# Patient Record
Sex: MALE | Race: BLACK OR AFRICAN AMERICAN | NOT HISPANIC OR LATINO | Employment: FULL TIME | URBAN - METROPOLITAN AREA
[De-identification: names, ages, dates, MRNs, and addresses within clinical notes are randomized per-mention and may not be internally consistent; named-entity substitution may affect disease eponyms.]

---

## 2024-09-11 ENCOUNTER — HOSPITAL ENCOUNTER (INPATIENT)
Facility: HOSPITAL | Age: 44
LOS: 2 days | Discharge: HOME | DRG: 322 | End: 2024-09-13
Attending: STUDENT IN AN ORGANIZED HEALTH CARE EDUCATION/TRAINING PROGRAM | Admitting: INTERNAL MEDICINE
Payer: COMMERCIAL

## 2024-09-11 DIAGNOSIS — R07.9 CHEST PAIN, UNSPECIFIED TYPE: ICD-10-CM

## 2024-09-11 DIAGNOSIS — R07.9 CHEST PAIN: ICD-10-CM

## 2024-09-11 DIAGNOSIS — I21.3 STEMI (ST ELEVATION MYOCARDIAL INFARCTION): Primary | ICD-10-CM

## 2024-09-11 DIAGNOSIS — I25.10 CAD (CORONARY ARTERY DISEASE): ICD-10-CM

## 2024-09-11 DIAGNOSIS — I21.3 ST ELEVATION MYOCARDIAL INFARCTION (STEMI), UNSPECIFIED ARTERY: ICD-10-CM

## 2024-09-11 PROBLEM — I21.21 ST ELEVATION MYOCARDIAL INFARCTION INVOLVING LEFT CIRCUMFLEX CORONARY ARTERY: Status: ACTIVE | Noted: 2024-09-11

## 2024-09-11 LAB
ABS NEUT (OLG): 17.37 X10(3)/MCL (ref 2.1–9.2)
ALBUMIN SERPL-MCNC: 4.4 G/DL (ref 3.5–5)
ALBUMIN/GLOB SERPL: 1.2 RATIO (ref 1.1–2)
ALP SERPL-CCNC: 100 UNIT/L (ref 40–150)
ALT SERPL-CCNC: 33 UNIT/L (ref 0–55)
ANION GAP SERPL CALC-SCNC: 21 MEQ/L
APICAL FOUR CHAMBER EJECTION FRACTION: 60 %
APICAL TWO CHAMBER EJECTION FRACTION: 57 %
AST SERPL-CCNC: 36 UNIT/L (ref 5–34)
AV INDEX (PROSTH): 0.98
AV MEAN GRADIENT: 4 MMHG
AV PEAK GRADIENT: 7 MMHG
AV VALVE AREA BY VELOCITY RATIO: 2.86 CM²
AV VALVE AREA: 2.79 CM²
AV VELOCITY RATIO: 1.01
BILIRUB SERPL-MCNC: 0.3 MG/DL
BSA FOR ECHO PROCEDURE: 1.78 M2
BUN SERPL-MCNC: 13.5 MG/DL (ref 8.9–20.6)
CALCIUM SERPL-MCNC: 9.1 MG/DL (ref 8.4–10.2)
CHLORIDE SERPL-SCNC: 110 MMOL/L (ref 98–107)
CO2 SERPL-SCNC: 13 MMOL/L (ref 22–29)
CREAT SERPL-MCNC: 1.07 MG/DL (ref 0.73–1.18)
CREAT/UREA NIT SERPL: 13
CV ECHO LV RWT: 0.47 CM
DOP CALC AO PEAK VEL: 1.28 M/S
DOP CALC AO VTI: 19.8 CM
DOP CALC LVOT AREA: 2.8 CM2
DOP CALC LVOT DIAMETER: 1.9 CM
DOP CALC LVOT PEAK VEL: 1.29 M/S
DOP CALC LVOT STROKE VOLUME: 55.26 CM3
DOP CALC MV VTI: 19.3 CM
DOP CALCLVOT PEAK VEL VTI: 19.5 CM
E WAVE DECELERATION TIME: 161 MSEC
E/A RATIO: 0.95
E/E' RATIO: 7.64 M/S
ECHO LV POSTERIOR WALL: 0.9 CM (ref 0.6–1.1)
ERYTHROCYTE [DISTWIDTH] IN BLOOD BY AUTOMATED COUNT: 13 % (ref 11.5–17)
FRACTIONAL SHORTENING: 32 % (ref 28–44)
GFR SERPLBLD CREATININE-BSD FMLA CKD-EPI: >60 ML/MIN/1.73/M2
GLOBULIN SER-MCNC: 3.6 GM/DL (ref 2.4–3.5)
GLUCOSE SERPL-MCNC: 61 MG/DL (ref 74–100)
HCT VFR BLD AUTO: 49.5 % (ref 42–52)
HGB BLD-MCNC: 16.4 G/DL (ref 14–18)
INSTRUMENT WBC (OLG): 24.47 X10(3)/MCL
INTERVENTRICULAR SEPTUM: 0.8 CM (ref 0.6–1.1)
LEFT ATRIUM AREA SYSTOLIC (APICAL 2 CHAMBER): 11.9 CM2
LEFT ATRIUM AREA SYSTOLIC (APICAL 4 CHAMBER): 12.1 CM2
LEFT ATRIUM SIZE: 2.9 CM
LEFT ATRIUM VOLUME INDEX MOD: 15.5 ML/M2
LEFT ATRIUM VOLUME MOD: 27.4 CM3
LEFT INTERNAL DIMENSION IN SYSTOLE: 2.6 CM (ref 2.1–4)
LEFT VENTRICLE DIASTOLIC VOLUME INDEX: 35.03 ML/M2
LEFT VENTRICLE DIASTOLIC VOLUME: 62 ML
LEFT VENTRICLE END DIASTOLIC VOLUME APICAL 2 CHAMBER: 60.4 ML
LEFT VENTRICLE END DIASTOLIC VOLUME APICAL 4 CHAMBER: 66.3 ML
LEFT VENTRICLE END SYSTOLIC VOLUME APICAL 2 CHAMBER: 28.1 ML
LEFT VENTRICLE END SYSTOLIC VOLUME APICAL 4 CHAMBER: 26.2 ML
LEFT VENTRICLE MASS INDEX: 53 G/M2
LEFT VENTRICLE SYSTOLIC VOLUME INDEX: 13.9 ML/M2
LEFT VENTRICLE SYSTOLIC VOLUME: 24.6 ML
LEFT VENTRICULAR INTERNAL DIMENSION IN DIASTOLE: 3.8 CM (ref 3.5–6)
LEFT VENTRICULAR MASS: 93.37 G
LV LATERAL E/E' RATIO: 7 M/S
LV SEPTAL E/E' RATIO: 8.4 M/S
LVED V (TEICH): 62 ML
LVES V (TEICH): 24.6 ML
LVOT MG: 3 MMHG
LVOT MV: 0.82 CM/S
LYMPHOCYTES NFR BLD MANUAL: 21 %
LYMPHOCYTES NFR BLD MANUAL: 5.14 X10(3)/MCL
MCH RBC QN AUTO: 32.2 PG (ref 27–31)
MCHC RBC AUTO-ENTMCNC: 33.1 G/DL (ref 33–36)
MCV RBC AUTO: 97.1 FL (ref 80–94)
MONOCYTES NFR BLD MANUAL: 1.96 X10(3)/MCL (ref 0.1–1.3)
MONOCYTES NFR BLD MANUAL: 8 %
MV MEAN GRADIENT: 3 MMHG
MV PEAK A VEL: 0.88 M/S
MV PEAK E VEL: 0.84 M/S
MV PEAK GRADIENT: 4 MMHG
MV VALVE AREA BY CONTINUITY EQUATION: 2.86 CM2
NEUTROPHILS NFR BLD MANUAL: 71 %
NRBC BLD AUTO-RTO: 0 %
OHS CV RV/LV RATIO: 0.58 CM
OHS LV EJECTION FRACTION SIMPSONS BIPLANE MOD: 58 %
PLATELET # BLD AUTO: 327 X10(3)/MCL (ref 130–400)
PLATELET # BLD EST: NORMAL 10*3/UL
PMV BLD AUTO: 9.3 FL (ref 7.4–10.4)
POC CARDIAC TROPONIN I: 0.02 NG/ML (ref 0–0.08)
POTASSIUM SERPL-SCNC: 4.2 MMOL/L (ref 3.5–5.1)
PROT SERPL-MCNC: 8 GM/DL (ref 6.4–8.3)
RA WIDTH: 3.8 CM
RBC # BLD AUTO: 5.1 X10(6)/MCL (ref 4.7–6.1)
RBC MORPH BLD: NORMAL
RIGHT VENTRICULAR END-DIASTOLIC DIMENSION: 2.2 CM
SAMPLE: NORMAL
SODIUM SERPL-SCNC: 144 MMOL/L (ref 136–145)
TDI LATERAL: 0.12 M/S
TDI SEPTAL: 0.1 M/S
TDI: 0.11 M/S
TRICUSPID ANNULAR PLANE SYSTOLIC EXCURSION: 1.91 CM
TROPONIN I SERPL-MCNC: 1.33 NG/ML (ref 0–0.04)
TROPONIN I SERPL-MCNC: <0.01 NG/ML (ref 0–0.04)
WBC # BLD AUTO: 24.47 X10(3)/MCL (ref 4.5–11.5)
Z-SCORE OF LEFT VENTRICULAR DIMENSION IN END DIASTOLE: -2.53
Z-SCORE OF LEFT VENTRICULAR DIMENSION IN END SYSTOLE: -1.2

## 2024-09-11 PROCEDURE — 93458 L HRT ARTERY/VENTRICLE ANGIO: CPT | Mod: XU | Performed by: INTERNAL MEDICINE

## 2024-09-11 PROCEDURE — 63600175 PHARM REV CODE 636 W HCPCS: Performed by: STUDENT IN AN ORGANIZED HEALTH CARE EDUCATION/TRAINING PROGRAM

## 2024-09-11 PROCEDURE — C1769 GUIDE WIRE: HCPCS | Performed by: INTERNAL MEDICINE

## 2024-09-11 PROCEDURE — 4A023N7 MEASUREMENT OF CARDIAC SAMPLING AND PRESSURE, LEFT HEART, PERCUTANEOUS APPROACH: ICD-10-PCS | Performed by: INTERNAL MEDICINE

## 2024-09-11 PROCEDURE — 80053 COMPREHEN METABOLIC PANEL: CPT | Performed by: STUDENT IN AN ORGANIZED HEALTH CARE EDUCATION/TRAINING PROGRAM

## 2024-09-11 PROCEDURE — 84484 ASSAY OF TROPONIN QUANT: CPT | Performed by: INTERNAL MEDICINE

## 2024-09-11 PROCEDURE — 27000221 HC OXYGEN, UP TO 24 HOURS

## 2024-09-11 PROCEDURE — 99291 CRITICAL CARE FIRST HOUR: CPT

## 2024-09-11 PROCEDURE — 25000003 PHARM REV CODE 250: Performed by: INTERNAL MEDICINE

## 2024-09-11 PROCEDURE — 11000001 HC ACUTE MED/SURG PRIVATE ROOM

## 2024-09-11 PROCEDURE — B2111ZZ FLUOROSCOPY OF MULTIPLE CORONARY ARTERIES USING LOW OSMOLAR CONTRAST: ICD-10-PCS | Performed by: INTERNAL MEDICINE

## 2024-09-11 PROCEDURE — C1753 CATH, INTRAVAS ULTRASOUND: HCPCS | Performed by: INTERNAL MEDICINE

## 2024-09-11 PROCEDURE — C9606 PERC D-E COR REVASC W AMI S: HCPCS | Mod: LC | Performed by: INTERNAL MEDICINE

## 2024-09-11 PROCEDURE — C1874 STENT, COATED/COV W/DEL SYS: HCPCS | Performed by: INTERNAL MEDICINE

## 2024-09-11 PROCEDURE — 85007 BL SMEAR W/DIFF WBC COUNT: CPT | Performed by: INTERNAL MEDICINE

## 2024-09-11 PROCEDURE — 63600175 PHARM REV CODE 636 W HCPCS: Performed by: INTERNAL MEDICINE

## 2024-09-11 PROCEDURE — 93010 ELECTROCARDIOGRAM REPORT: CPT | Mod: ,,, | Performed by: INTERNAL MEDICINE

## 2024-09-11 PROCEDURE — 25500020 PHARM REV CODE 255: Performed by: INTERNAL MEDICINE

## 2024-09-11 PROCEDURE — 25000003 PHARM REV CODE 250: Performed by: NURSE PRACTITIONER

## 2024-09-11 PROCEDURE — 027034Z DILATION OF CORONARY ARTERY, ONE ARTERY WITH DRUG-ELUTING INTRALUMINAL DEVICE, PERCUTANEOUS APPROACH: ICD-10-PCS | Performed by: INTERNAL MEDICINE

## 2024-09-11 PROCEDURE — 27201423 OPTIME MED/SURG SUP & DEVICES STERILE SUPPLY: Performed by: INTERNAL MEDICINE

## 2024-09-11 PROCEDURE — C1760 CLOSURE DEV, VASC: HCPCS | Performed by: INTERNAL MEDICINE

## 2024-09-11 PROCEDURE — B240ZZ3 ULTRASONOGRAPHY OF SINGLE CORONARY ARTERY, INTRAVASCULAR: ICD-10-PCS | Performed by: INTERNAL MEDICINE

## 2024-09-11 PROCEDURE — B2151ZZ FLUOROSCOPY OF LEFT HEART USING LOW OSMOLAR CONTRAST: ICD-10-PCS | Performed by: INTERNAL MEDICINE

## 2024-09-11 PROCEDURE — C1894 INTRO/SHEATH, NON-LASER: HCPCS | Performed by: INTERNAL MEDICINE

## 2024-09-11 PROCEDURE — 93005 ELECTROCARDIOGRAM TRACING: CPT

## 2024-09-11 PROCEDURE — 21400001 HC TELEMETRY ROOM

## 2024-09-11 PROCEDURE — 85027 COMPLETE CBC AUTOMATED: CPT | Performed by: STUDENT IN AN ORGANIZED HEALTH CARE EDUCATION/TRAINING PROGRAM

## 2024-09-11 PROCEDURE — C1887 CATHETER, GUIDING: HCPCS | Performed by: INTERNAL MEDICINE

## 2024-09-11 PROCEDURE — 96374 THER/PROPH/DIAG INJ IV PUSH: CPT

## 2024-09-11 PROCEDURE — C1725 CATH, TRANSLUMIN NON-LASER: HCPCS | Performed by: INTERNAL MEDICINE

## 2024-09-11 PROCEDURE — 92978 ENDOLUMINL IVUS OCT C 1ST: CPT | Mod: LC | Performed by: INTERNAL MEDICINE

## 2024-09-11 PROCEDURE — 36415 COLL VENOUS BLD VENIPUNCTURE: CPT | Performed by: INTERNAL MEDICINE

## 2024-09-11 PROCEDURE — 84484 ASSAY OF TROPONIN QUANT: CPT | Performed by: STUDENT IN AN ORGANIZED HEALTH CARE EDUCATION/TRAINING PROGRAM

## 2024-09-11 PROCEDURE — 25000003 PHARM REV CODE 250: Performed by: STUDENT IN AN ORGANIZED HEALTH CARE EDUCATION/TRAINING PROGRAM

## 2024-09-11 DEVICE — EVEROLIMUS-ELUTING PLATINUM CHROMIUM CORONARY STENT SYSTEM
Type: IMPLANTABLE DEVICE | Site: CORONARY | Status: FUNCTIONAL
Brand: SYNERGY™ XD

## 2024-09-11 DEVICE — ANGIO-SEAL VIP VASCULAR CLOSURE DEVICE
Type: IMPLANTABLE DEVICE | Site: CORONARY | Status: FUNCTIONAL
Brand: ANGIO-SEAL

## 2024-09-11 RX ORDER — HYDROCODONE BITARTRATE AND ACETAMINOPHEN 5; 325 MG/1; MG/1
1 TABLET ORAL EVERY 4 HOURS PRN
Status: DISCONTINUED | OUTPATIENT
Start: 2024-09-11 | End: 2024-09-13 | Stop reason: HOSPADM

## 2024-09-11 RX ORDER — ATORVASTATIN CALCIUM 40 MG/1
80 TABLET, FILM COATED ORAL DAILY
Status: DISCONTINUED | OUTPATIENT
Start: 2024-09-11 | End: 2024-09-13 | Stop reason: HOSPADM

## 2024-09-11 RX ORDER — SODIUM CHLORIDE 9 MG/ML
INJECTION, SOLUTION INTRAVENOUS CONTINUOUS
Status: ACTIVE | OUTPATIENT
Start: 2024-09-11 | End: 2024-09-11

## 2024-09-11 RX ORDER — CEFAZOLIN SODIUM 1 G/3ML
INJECTION, POWDER, FOR SOLUTION INTRAMUSCULAR; INTRAVENOUS
Status: DISCONTINUED | OUTPATIENT
Start: 2024-09-11 | End: 2024-09-11 | Stop reason: HOSPADM

## 2024-09-11 RX ORDER — NITROGLYCERIN 20 MG/100ML
INJECTION INTRAVENOUS
Status: DISCONTINUED | OUTPATIENT
Start: 2024-09-11 | End: 2024-09-11 | Stop reason: HOSPADM

## 2024-09-11 RX ORDER — HYDRALAZINE HYDROCHLORIDE 20 MG/ML
10 INJECTION INTRAMUSCULAR; INTRAVENOUS EVERY 4 HOURS PRN
Status: DISCONTINUED | OUTPATIENT
Start: 2024-09-11 | End: 2024-09-13 | Stop reason: HOSPADM

## 2024-09-11 RX ORDER — ASPIRIN 81 MG/1
81 TABLET ORAL DAILY
Status: DISCONTINUED | OUTPATIENT
Start: 2024-09-12 | End: 2024-09-13 | Stop reason: HOSPADM

## 2024-09-11 RX ORDER — ALUMINUM HYDROXIDE, MAGNESIUM HYDROXIDE, AND SIMETHICONE 1200; 120; 1200 MG/30ML; MG/30ML; MG/30ML
30 SUSPENSION ORAL EVERY 4 HOURS PRN
Status: DISCONTINUED | OUTPATIENT
Start: 2024-09-11 | End: 2024-09-13 | Stop reason: HOSPADM

## 2024-09-11 RX ORDER — ACETAMINOPHEN 325 MG/1
650 TABLET ORAL EVERY 4 HOURS PRN
Status: DISCONTINUED | OUTPATIENT
Start: 2024-09-11 | End: 2024-09-13 | Stop reason: HOSPADM

## 2024-09-11 RX ORDER — ONDANSETRON 4 MG/1
8 TABLET, ORALLY DISINTEGRATING ORAL EVERY 8 HOURS PRN
Status: DISCONTINUED | OUTPATIENT
Start: 2024-09-11 | End: 2024-09-13 | Stop reason: HOSPADM

## 2024-09-11 RX ORDER — LIDOCAINE HYDROCHLORIDE 10 MG/ML
INJECTION, SOLUTION EPIDURAL; INFILTRATION; INTRACAUDAL; PERINEURAL
Status: DISCONTINUED | OUTPATIENT
Start: 2024-09-11 | End: 2024-09-11 | Stop reason: HOSPADM

## 2024-09-11 RX ORDER — HEPARIN SODIUM 5000 [USP'U]/ML
5000 INJECTION, SOLUTION INTRAVENOUS; SUBCUTANEOUS
Status: COMPLETED | OUTPATIENT
Start: 2024-09-11 | End: 2024-09-11

## 2024-09-11 RX ORDER — HEPARIN SODIUM 1000 [USP'U]/ML
INJECTION, SOLUTION INTRAVENOUS; SUBCUTANEOUS
Status: DISCONTINUED | OUTPATIENT
Start: 2024-09-11 | End: 2024-09-11 | Stop reason: HOSPADM

## 2024-09-11 RX ORDER — DIPHENHYDRAMINE HYDROCHLORIDE 50 MG/ML
INJECTION INTRAMUSCULAR; INTRAVENOUS
Status: DISCONTINUED | OUTPATIENT
Start: 2024-09-11 | End: 2024-09-11 | Stop reason: HOSPADM

## 2024-09-11 RX ORDER — FENTANYL CITRATE 50 UG/ML
INJECTION, SOLUTION INTRAMUSCULAR; INTRAVENOUS
Status: DISCONTINUED | OUTPATIENT
Start: 2024-09-11 | End: 2024-09-11 | Stop reason: HOSPADM

## 2024-09-11 RX ORDER — IOPAMIDOL 755 MG/ML
INJECTION, SOLUTION INTRAVASCULAR
Status: DISCONTINUED | OUTPATIENT
Start: 2024-09-11 | End: 2024-09-11 | Stop reason: HOSPADM

## 2024-09-11 RX ORDER — MORPHINE SULFATE 4 MG/ML
2 INJECTION, SOLUTION INTRAMUSCULAR; INTRAVENOUS EVERY 4 HOURS PRN
Status: DISCONTINUED | OUTPATIENT
Start: 2024-09-11 | End: 2024-09-13 | Stop reason: HOSPADM

## 2024-09-11 RX ADMIN — TICAGRELOR 90 MG: 90 TABLET ORAL at 08:09

## 2024-09-11 RX ADMIN — ALUMINUM HYDROXIDE, MAGNESIUM HYDROXIDE, AND SIMETHICONE 30 ML: 1200; 120; 1200 SUSPENSION ORAL at 06:09

## 2024-09-11 RX ADMIN — TICAGRELOR 180 MG: 90 TABLET ORAL at 08:09

## 2024-09-11 RX ADMIN — HEPARIN SODIUM 5000 UNITS: 5000 INJECTION, SOLUTION INTRAVENOUS; SUBCUTANEOUS at 08:09

## 2024-09-11 NOTE — NURSING
Nurses Note -- 4 Eyes      9/11/2024   4:00 PM      Skin assessed during: Admit      [x] No Altered Skin Integrity Present    []Prevention Measures Documented      [] Yes- Altered Skin Integrity Present or Discovered   [] LDA Added if Not in Epic (Describe Wound)   [] New Altered Skin Integrity was Present on Admit and Documented in LDA   [] Wound Image Taken    Wound Care Consulted? No    Attending Nurse:  Catia Torres RN/Staff Member:  Leatha Louie RN

## 2024-09-11 NOTE — H&P
"[facility]    Cardiology  History and Physical     Patient Name: Roberto Camargo  MRN: [id number]  Admission Date: 9/11/2024  Code Status: No Order   Attending Provider: Jim Carreno IV, MD   Primary Care Physician: No primary care provider on file.  Principal Problem:<principal problem not specified>    Patient information was obtained from patient, past medical records, and ER records.     Subjective:     Chief Complaint: CP     HPI: Mr. Camargo is a 45 y/o male who is unknown to [facility]. The patient presented to [facility] on 9.11.24 with c/o CP. The patient reported that this AM he was having a "stressful and emotional argument" with his wife and he developed substernal CP that was rated 10/10 on a verbal scale. The pain was described as a "pressure" that was associated with SOB. He reported that the pain was so severe he fell to the floor at its onset. EMS was notified and upon arrival he was given FD ASA/SL Nitro and an EKG confirmed a STEMI. He was transported to [facility] ER for evaluation and workup. [facility] was consulted for Recommendations.     PMH: HLD  PSH: Denies Past Surgical History   Family History: + Family History of Heart Disease; Maternal and Paternal Grandparents  Social History: Denies Illicit Drug and ETOH Use; + Tobacco Use 1/2-1 PPD for 20 + Years     Previous Cardiac Diagnostics: None    Review of patient's allergies indicates:  No Known Allergies    No current facility-administered medications on file prior to encounter.     No current outpatient medications on file prior to encounter.     Review of Systems   Constitutional: Positive for malaise/fatigue.   Cardiovascular:  Positive for chest pain. Negative for leg swelling, palpitations and paroxysmal nocturnal dyspnea.   Respiratory:  Positive for shortness of breath.    All other systems reviewed and are negative.    Objective:     Vital Signs (Most Recent):  Temp: 97.5 °F (36.4 °C) (09/11/24 0839)  Pulse: 107 (09/11/24 0839)  Resp: " "(!) 22 (09/11/24 0839)  BP: 118/78 (09/11/24 0839)  SpO2: (!) 94 % (09/11/24 0839) Vital Signs (24h Range):  Temp:  [97.5 °F (36.4 °C)] 97.5 °F (36.4 °C)  Pulse:  [107] 107  Resp:  [22] 22  SpO2:  [94 %] 94 %  BP: (118)/(78) 118/78     Weight: 68 kg (150 lb)  Body mass index is 24.21 kg/m².    SpO2: (!) 94 %       No intake or output data in the 24 hours ending 09/11/24 0842    Lines/Drains/Airways       None                 Physical Exam  Constitutional:       General: He is not in acute distress.     Appearance: Normal appearance.   HENT:      Head: Normocephalic.      Mouth/Throat:      Mouth: Mucous membranes are moist.   Eyes:      Extraocular Movements: Extraocular movements intact.      Conjunctiva/sclera: Conjunctivae normal.   Cardiovascular:      Rate and Rhythm: Normal rate and regular rhythm.      Heart sounds: Normal heart sounds.   Pulmonary:      Effort: Pulmonary effort is normal. No respiratory distress.      Breath sounds: Normal breath sounds.   Abdominal:      Palpations: Abdomen is soft.   Musculoskeletal:         General: Normal range of motion.   Skin:     General: Skin is warm and dry.   Neurological:      General: No focal deficit present.      Mental Status: He is alert and oriented to person, place, and time.   Psychiatric:         Mood and Affect: Mood normal.       EKG: Reviewed     Telemetry: SR    Home Medications:   No current facility-administered medications on file prior to encounter.     No current outpatient medications on file prior to encounter.     Current Schedule Inpatient Medications:   heparin (porcine)  5,000 Units Intravenous ED 1 Time    ticagrelor  180 mg Oral BID     Continuous Infusions:     Significant Labs:   Chemistries:   No results for input(s): "NA", "K", "CL", "CO2", "BUN", "CREATININE", "CALCIUM", "PROT", "BILITOT", "ALKPHOS", "ALT", "AST", "GLUCOSE", "MG", "PHOS", "TROPONINI" in the last 168 hours.    Invalid input(s): "LABALBU"     CBC/Anemia Labs: Coags:  " "  No results for input(s): "WBC", "HGB", "HCT", "PLT", "MCV", "RDW", "IRON", "FERRITIN", "RETIC", "FOLATE", "[id number]", "OCCULTBLOOD" in the last 168 hours.    Invalid input(s): "IRONSATURATED" No results for input(s): "PT", "INR", "APTT" in the last 168 hours.     Assessment and Plan:   STEMI - Anterior Leads with Reciprocal Changes  HLD  Tobacco Use   No Hx of GIB     PLAN:  Keep NPO  Schedule for Emergent LHC with Possible PTCA +/- Stenting Today   Risk, Benefits and Alternatives Reviewed and Discussed with the PT and he wishes to proceed with above Procedure.   ASA 324mg PO x 1  Heparin 5000 Units IV x 1 Now  Brilinta 180mg PO x 1 Now  ECHO Today  Labs and EKG in AM: CBC, CMP, Mg, A1C, TSH and Lipid Panel    Daniel Morse, KI  Cardiology  [facility]  09/11/2024   "

## 2024-09-11 NOTE — ED PROVIDER NOTES
Encounter Date: 9/11/2024    SCRIBE #1 NOTE: I, Syd Crowley, am scribing for, and in the presence of,  Jim Carreno IV, MD. I have scribed the following portions of the note - Other sections scribed: HPI, ROS, and PE..       History     Chief Complaint   Patient presents with    Chest Pain     Presents via [facility] with c/o chest pain. Given 324mg ASA and 2 spray NTG. No medical history.     Roberto Camargo is a 44 y.o. male patient with no known medical history who presents to the Emergency Department for evaluation of chest pain which onset gradually PTA. Pt began having chest pressure at home with SOB. EMS responded, initial EKG was consistent with anterior STEMI. Patient received 2 sprays nitro and 324 mg Aspirin en route which has improved his sxs significantly.       The history is provided by the patient and the EMS personnel. No  was used.     Review of patient's allergies indicates:  No Known Allergies  Past Medical History:   Diagnosis Date    Mixed hyperlipidemia      No past surgical history on file.  No family history on file.     Review of Systems   Constitutional:  Negative for chills and fever.   HENT:  Negative for congestion, rhinorrhea and sore throat.    Eyes:  Negative for visual disturbance.   Respiratory:  Positive for shortness of breath. Negative for cough.    Cardiovascular:  Positive for chest pain.   Gastrointestinal:  Negative for abdominal pain, nausea and vomiting.   Genitourinary:  Negative for dysuria and hematuria.   Musculoskeletal:  Negative for joint swelling.   Skin:  Negative for rash.   Neurological:  Negative for weakness.   Psychiatric/Behavioral:  Negative for confusion.    All other systems reviewed and are negative.      Physical Exam     Initial Vitals [09/11/24 0839]   BP Pulse Resp Temp SpO2   118/78 107 (!) 22 97.5 °F (36.4 °C) (!) 94 %      MAP       --         Physical Exam    Nursing note and vitals reviewed.  Constitutional: He is not diaphoretic.  No distress.   Uncomfortable     HENT:   Head: Normocephalic and atraumatic.   Neck: Neck supple.   Normal range of motion.  Cardiovascular:  Normal rate and regular rhythm.           Pulmonary/Chest: Breath sounds normal. No respiratory distress.   Abdominal: Abdomen is soft. He exhibits no distension. There is no abdominal tenderness.   Musculoskeletal:         General: No edema.      Cervical back: Normal range of motion and neck supple.     Neurological: He is alert and oriented to person, place, and time. He has normal strength. No cranial nerve deficit or sensory deficit.   Skin: Skin is warm. Capillary refill takes less than 2 seconds.   Psychiatric: He has a normal mood and affect.         ED Course   Critical Care    Date/Time: 9/11/2024 8:58 AM    Performed by: Jim Carreno IV, MD  Authorized by: Jim Carreno IV, MD  Total critical care time (exclusive of procedural time) : 20 minutes  Critical care time was exclusive of separately billable procedures and treating other patients.  Critical care was necessary to treat or prevent imminent or life-threatening deterioration of the following conditions: cardiac failure.  Critical care was time spent personally by me on the following activities: blood draw for specimens, development of treatment plan with patient or surrogate, discussions with consultants, evaluation of patient's response to treatment, interpretation of cardiac output measurements, examination of patient, ordering and review of laboratory studies, ordering and review of radiographic studies, ordering and performing treatments and interventions, obtaining history from patient or surrogate, review of old charts, pulse oximetry and re-evaluation of patient's condition.        Labs Reviewed   CBC WITH DIFFERENTIAL - Abnormal       Result Value    WBC 24.47 (*)     RBC 5.10      Hgb 16.4      Hct 49.5      MCV 97.1 (*)     MCH 32.2 (*)     MCHC 33.1      RDW 13.0      Platelet 327      MPV 9.3       NRBC% 0.0     CBC W/ AUTO DIFFERENTIAL    Narrative:     The following orders were created for panel order CBC auto differential.  Procedure                               Abnormality         Status                     ---------                               -----------         ------                     CBC with Differential[[id number]]       Abnormal            Final result               Manual Differential[[id number]]                             In process                   Please view results for these tests on the individual orders.   COMPREHENSIVE METABOLIC PANEL   TROPONIN I   MANUAL DIFFERENTIAL     EKG Readings: (Independently Interpreted)   Initial Reading: STEMI. Rhythm: Sinus Tachycardia. Heart Rate: 108. Ectopy: No Ectopy. Conduction: Normal. T Waves: Normal. Axis: Normal.   8:33. ST elevation in the anterior leads.        Imaging Results              X-Ray Chest AP Portable (No Result on File)                      Medications   ticagrelor tablet 180 mg (has no administration in time range)   heparin (porcine) injection 5,000 Units (has no administration in time range)     Medical Decision Making  Patient presenting with chest pressure relieved by nitro given aspirin EN route  Patient is a smoker denies any other medical history  Initial 12 lead done by EMS consistent with anterior STEMI, I agree with this interpretation I have evaluated his 12 lead done EN route  His repeat 12 lead EN route and on arrival here does show some dynamic changes discussed with cardiology they still plan on taking for cath lab intervention at this time will load with heparin Brilinta per their recommendations as well      The differential diagnosis includes, but is not limited to:  Judging by the patient's chief complaint and pertinent history, the patient has the following possible differential diagnoses, including but not limited to the following.  Some of these are deemed to be lower likelihood and some more likely  based on my physical exam and history combined with possible lab work and/or imaging studies.   Please see the pertinent studies, and refer to the HPI.  Some of these diagnoses will take further evaluation to fully rule out, perhaps as an outpatient and the patient was encouraged to follow up when discharged for more comprehensive evaluation.    Chest pain emergent diagnoses: ACS, PE, dissection, cardiac tamponade, tension pneumothorax.    Chest pain non-emergent diagnoses: Musculoskeletal, trauma, pleurisy, pneumonia, pleural effusion, GERD, other GI, neurologic, psychiatric and other non emergent diagnoses considered.            Problems Addressed:  Chest pain, unspecified type: acute illness or injury that poses a threat to life or bodily functions  STEMI (ST elevation myocardial infarction): acute illness or injury that poses a threat to life or bodily functions    Amount and/or Complexity of Data Reviewed  Independent Historian: EMS     Details: See HPI - EMS provided history   Labs: ordered.  ECG/medicine tests: ordered and independent interpretation performed.     Details: 8:33. ST elevation in the anterior leads. STEMI. Rhythm: Sinus Tachycardia. Heart Rate: 108. Ectopy: No Ectopy. Conduction: Normal. T Waves: Normal. Axis: Normal.     Discussion of management or test interpretation with external provider(s): [facility], interventional cardiology - will take for emergent cath intervention     Risk  Prescription drug management.  Drug therapy requiring intensive monitoring for toxicity.  Decision regarding hospitalization.  Emergency major surgery.    Critical Care  Total time providing critical care: 35 minutes            Scribe Attestation:   Scribe #1: I performed the above scribed service and the documentation accurately describes the services I performed. I attest to the accuracy of the note.    Attending Attestation:           Physician Attestation for Scribe:  Physician Attestation Statement for Scribe #1: I,  Jim Carreno IV, MD, reviewed documentation, as scribed by Syd Crowley in my presence, and it is both accurate and complete.             ED Course as of 09/11/24 0857   Wed Sep 11, 2024   0837 Patient presenting with chest pressure relieved by nitro given aspirin EN route  Patient is a smoker denies any other medical history  Initial 12 lead done by EMS consistent with anterior STEMI, I agree with this interpretation I have evaluated his 12 lead done EN route  His repeat 12 lead EN route and on arrival here does show some dynamic changes discussed with cardiology they still plan on taking for cath lab intervention at this time will load with heparin Brilinta per their recommendations as well [AC]      ED Course User Index  [AC] Jim Carreno IV, MD                           Clinical Impression:  Final diagnoses:  [I21.3] STEMI (ST elevation myocardial infarction) (Primary)  [R07.9] Chest pain, unspecified type          ED Disposition Condition    Admit Stable                   Jim Carreno IV, MD  09/11/24 0854

## 2024-09-11 NOTE — ED NOTES
Pt says that he woke up this morning and began having 10/10 chest pain and SOB.  He describes the pain as pressure across his central chest. He received 2 sprays of NTG and 324 of ASA with EMS.  He denies chest pain or SOB at this time.  He denies any cardiac or medical history, but does have a familial history of cardiac problems.  Pt is a smoker.      Groin was shaved and prepped for cath lab.

## 2024-09-11 NOTE — Clinical Note
The groin and right radial was prepped. The site was prepped with ChloraPrep. The patient was draped. 0 = swallows foods/liquids without difficulty

## 2024-09-11 NOTE — NURSING
Admission documentation completed by the admit nurse. Home med rec complete - pt does not take any medication. Pt did elect for meds to bed with [facility]. 4 Eyes and standing weight to be completed by the admitting floor nurse.

## 2024-09-11 NOTE — Clinical Note
The catheter was inserted into the, was removed from the and was inserted over the wire into the ostium   right coronary artery. An angiography was performed of the right coronary arteries. Multiple views were taken. The angiography was performed via power injection.  DAVID

## 2024-09-12 LAB
ALBUMIN SERPL-MCNC: 3.8 G/DL (ref 3.5–5)
ALBUMIN/GLOB SERPL: 1.5 RATIO (ref 1.1–2)
ALP SERPL-CCNC: 82 UNIT/L (ref 40–150)
ALT SERPL-CCNC: 35 UNIT/L (ref 0–55)
ANION GAP SERPL CALC-SCNC: 6 MEQ/L
AST SERPL-CCNC: 43 UNIT/L (ref 5–34)
BASOPHILS # BLD AUTO: 0.08 X10(3)/MCL
BASOPHILS # BLD AUTO: 0.11 X10(3)/MCL
BASOPHILS NFR BLD AUTO: 0.6 %
BASOPHILS NFR BLD AUTO: 0.8 %
BILIRUB SERPL-MCNC: 0.3 MG/DL
BUN SERPL-MCNC: 16.7 MG/DL (ref 8.9–20.6)
CALCIUM SERPL-MCNC: 9.2 MG/DL (ref 8.4–10.2)
CHLORIDE SERPL-SCNC: 108 MMOL/L (ref 98–107)
CHOLEST SERPL-MCNC: 220 MG/DL
CHOLEST/HDLC SERPL: 5 {RATIO} (ref 0–5)
CO2 SERPL-SCNC: 25 MMOL/L (ref 22–29)
CREAT SERPL-MCNC: 0.92 MG/DL (ref 0.73–1.18)
CREAT/UREA NIT SERPL: 18
EOSINOPHIL # BLD AUTO: 0.33 X10(3)/MCL (ref 0–0.9)
EOSINOPHIL # BLD AUTO: 0.37 X10(3)/MCL (ref 0–0.9)
EOSINOPHIL NFR BLD AUTO: 2.3 %
EOSINOPHIL NFR BLD AUTO: 2.7 %
ERYTHROCYTE [DISTWIDTH] IN BLOOD BY AUTOMATED COUNT: 13.2 % (ref 11.5–17)
ERYTHROCYTE [DISTWIDTH] IN BLOOD BY AUTOMATED COUNT: 13.2 % (ref 11.5–17)
EST. AVERAGE GLUCOSE BLD GHB EST-MCNC: 99.7 MG/DL
GFR SERPLBLD CREATININE-BSD FMLA CKD-EPI: >60 ML/MIN/1.73/M2
GLOBULIN SER-MCNC: 2.5 GM/DL (ref 2.4–3.5)
GLUCOSE SERPL-MCNC: 101 MG/DL (ref 74–100)
HBA1C MFR BLD: 5.1 %
HCT VFR BLD AUTO: 44.2 % (ref 42–52)
HCT VFR BLD AUTO: 44.5 % (ref 42–52)
HDLC SERPL-MCNC: 48 MG/DL (ref 35–60)
HGB BLD-MCNC: 14.6 G/DL (ref 14–18)
HGB BLD-MCNC: 14.6 G/DL (ref 14–18)
IMM GRANULOCYTES # BLD AUTO: 0.05 X10(3)/MCL (ref 0–0.04)
IMM GRANULOCYTES # BLD AUTO: 0.06 X10(3)/MCL (ref 0–0.04)
IMM GRANULOCYTES NFR BLD AUTO: 0.4 %
IMM GRANULOCYTES NFR BLD AUTO: 0.4 %
LDLC SERPL CALC-MCNC: 151 MG/DL (ref 50–140)
LYMPHOCYTES # BLD AUTO: 3.53 X10(3)/MCL (ref 0.6–4.6)
LYMPHOCYTES # BLD AUTO: 4.02 X10(3)/MCL (ref 0.6–4.6)
LYMPHOCYTES NFR BLD AUTO: 25.5 %
LYMPHOCYTES NFR BLD AUTO: 27.6 %
MAGNESIUM SERPL-MCNC: 2.2 MG/DL (ref 1.6–2.6)
MCH RBC QN AUTO: 31.7 PG (ref 27–31)
MCH RBC QN AUTO: 31.9 PG (ref 27–31)
MCHC RBC AUTO-ENTMCNC: 32.8 G/DL (ref 33–36)
MCHC RBC AUTO-ENTMCNC: 33 G/DL (ref 33–36)
MCV RBC AUTO: 96.1 FL (ref 80–94)
MCV RBC AUTO: 97.4 FL (ref 80–94)
MONOCYTES # BLD AUTO: 1.47 X10(3)/MCL (ref 0.1–1.3)
MONOCYTES # BLD AUTO: 1.85 X10(3)/MCL (ref 0.1–1.3)
MONOCYTES NFR BLD AUTO: 10.6 %
MONOCYTES NFR BLD AUTO: 12.7 %
NEUTROPHILS # BLD AUTO: 8.22 X10(3)/MCL (ref 2.1–9.2)
NEUTROPHILS # BLD AUTO: 8.33 X10(3)/MCL (ref 2.1–9.2)
NEUTROPHILS NFR BLD AUTO: 56.2 %
NEUTROPHILS NFR BLD AUTO: 60.2 %
NRBC BLD AUTO-RTO: 0 %
NRBC BLD AUTO-RTO: 0 %
OHS QRS DURATION: 68 MS
OHS QRS DURATION: 80 MS
OHS QTC CALCULATION: 474 MS
OHS QTC CALCULATION: 485 MS
PLATELET # BLD AUTO: 249 X10(3)/MCL (ref 130–400)
PLATELET # BLD AUTO: 266 X10(3)/MCL (ref 130–400)
PMV BLD AUTO: 9.2 FL (ref 7.4–10.4)
PMV BLD AUTO: 9.4 FL (ref 7.4–10.4)
POTASSIUM SERPL-SCNC: 4.4 MMOL/L (ref 3.5–5.1)
PROT SERPL-MCNC: 6.3 GM/DL (ref 6.4–8.3)
RBC # BLD AUTO: 4.57 X10(6)/MCL (ref 4.7–6.1)
RBC # BLD AUTO: 4.6 X10(6)/MCL (ref 4.7–6.1)
SODIUM SERPL-SCNC: 139 MMOL/L (ref 136–145)
TRIGL SERPL-MCNC: 107 MG/DL (ref 34–140)
TROPONIN I SERPL-MCNC: 1.58 NG/ML (ref 0–0.04)
TSH SERPL-ACNC: 2 UIU/ML (ref 0.35–4.94)
VLDLC SERPL CALC-MCNC: 21 MG/DL
WBC # BLD AUTO: 13.83 X10(3)/MCL (ref 4.5–11.5)
WBC # BLD AUTO: 14.59 X10(3)/MCL (ref 4.5–11.5)

## 2024-09-12 PROCEDURE — 80053 COMPREHEN METABOLIC PANEL: CPT | Performed by: NURSE PRACTITIONER

## 2024-09-12 PROCEDURE — 83735 ASSAY OF MAGNESIUM: CPT | Performed by: NURSE PRACTITIONER

## 2024-09-12 PROCEDURE — 85025 COMPLETE CBC W/AUTO DIFF WBC: CPT | Performed by: NURSE PRACTITIONER

## 2024-09-12 PROCEDURE — 84484 ASSAY OF TROPONIN QUANT: CPT | Performed by: INTERNAL MEDICINE

## 2024-09-12 PROCEDURE — 80061 LIPID PANEL: CPT | Performed by: NURSE PRACTITIONER

## 2024-09-12 PROCEDURE — 84443 ASSAY THYROID STIM HORMONE: CPT | Performed by: NURSE PRACTITIONER

## 2024-09-12 PROCEDURE — 36415 COLL VENOUS BLD VENIPUNCTURE: CPT | Performed by: NURSE PRACTITIONER

## 2024-09-12 PROCEDURE — 83036 HEMOGLOBIN GLYCOSYLATED A1C: CPT | Performed by: NURSE PRACTITIONER

## 2024-09-12 PROCEDURE — 25000003 PHARM REV CODE 250: Performed by: NURSE PRACTITIONER

## 2024-09-12 PROCEDURE — 25000003 PHARM REV CODE 250: Performed by: INTERNAL MEDICINE

## 2024-09-12 PROCEDURE — 93005 ELECTROCARDIOGRAM TRACING: CPT

## 2024-09-12 PROCEDURE — 93010 ELECTROCARDIOGRAM REPORT: CPT | Mod: ,,, | Performed by: INTERNAL MEDICINE

## 2024-09-12 PROCEDURE — 21400001 HC TELEMETRY ROOM

## 2024-09-12 RX ORDER — CARVEDILOL 3.12 MG/1
3.12 TABLET ORAL 2 TIMES DAILY
Status: DISCONTINUED | OUTPATIENT
Start: 2024-09-12 | End: 2024-09-13 | Stop reason: HOSPADM

## 2024-09-12 RX ADMIN — TICAGRELOR 90 MG: 90 TABLET ORAL at 09:09

## 2024-09-12 RX ADMIN — CARVEDILOL 3.12 MG: 3.12 TABLET, FILM COATED ORAL at 10:09

## 2024-09-12 RX ADMIN — CARVEDILOL 3.12 MG: 3.12 TABLET, FILM COATED ORAL at 08:09

## 2024-09-12 RX ADMIN — ASPIRIN 81 MG: 81 TABLET, COATED ORAL at 09:09

## 2024-09-12 RX ADMIN — ATORVASTATIN CALCIUM 80 MG: 40 TABLET, FILM COATED ORAL at 09:09

## 2024-09-12 RX ADMIN — TICAGRELOR 90 MG: 90 TABLET ORAL at 08:09

## 2024-09-12 NOTE — PROGRESS NOTES
"[facility]    Cardiology  Progress Note      Patient Name: Roberto Camargo  MRN: [id number]  Admission Date: 9/11/2024  Code Status: No Order   Attending Provider: Gallo Zuniga MD   Primary Care Physician: No, Primary Doctor  Principal Problem:ST elevation myocardial infarction involving left circumflex coronary artery    Patient information was obtained from patient, past medical records, and ER records.     Subjective:     Chief Complaint: CP     HPI: Mr. Camargo is a 43 y/o male who is unknown to [facility]. The patient presented to [facility] on 9.11.24 with c/o CP. The patient reported that this AM he was having a "stressful and emotional argument" with his wife and he developed substernal CP that was rated 10/10 on a verbal scale. The pain was described as a "pressure" that was associated with SOB. He reported that the pain was so severe he fell to the floor at its onset. EMS was notified and upon arrival he was given FD ASA/SL Nitro and an EKG confirmed a STEMI. He was transported to [facility] ER for evaluation and workup. [facility] was consulted for Recommendations.     Hospital Course:  9.12.24: Ambulating in room.  Denies CP or SOB.  Pt wanting to go home or at least go outside so he can smoke.  VSS.  Labs stable.      PMH: HLD  PSH: Denies Past Surgical History   Family History: + Family History of Heart Disease; Maternal and Paternal Grandparents  Social History: Denies Illicit Drug and ETOH Use; + Tobacco Use 1/2-1 PPD for 20 + Years     Previous Cardiac Diagnostics:   TTE (9.11.24)    Left Ventricle: The left ventricle is normal in size. Normal wall thickness. There is normal systolic function with a visually estimated ejection fraction of 55 - 60%. There is normal diastolic function. Normal left ventricular filling pressure.    Right Ventricle: Normal right ventricular cavity size. Systolic function is normal.    Aortic Valve: The aortic valve is structurally normal. Aortic valve peak velocity is 1.28 " m/s. Mean gradient is 4 mmHg.    Mitral Valve: The mitral valve is structurally normal.    Tricuspid Valve: The tricuspid valve is structurally normal.    Pulmonic Valve: There is no stenosis.    Pericardium: There is no pericardial effusion    [facility] (9.11.24)    Successful IVUS guided PTCA and insertion of a 2.5 x 38 mm synergy drug-eluting stent from 70% thrombotic lesion in the mid left circumflex coronary artery down to 0% residual stenosis and FRANCO 3 flow    No obstructive coronary artery disease in the LAD, as well as, RCA    Right dominant system    Preserved LV systolic function    Mildly elevated LVEDP    Coronary findings:     Dominance: right   Left main:  No obstructive disease with less than 10% epicardial stenosis  Left anterior descending artery:  No obstructive disease with less than 10% epicardial stenosis.  Ostium of the large caliber diagonal 2 branch has 20% stenosis.  Circumflex artery:  Large caliber obtuse marginal 1 branch which is free of any obstructive disease with less than 10% epicardial stenosis.  In the mid true AV groove circumflex coronary artery (after the origin of the OM branch) haziness noted suggestive of possible ruptured plaque.  There is FRANCO 3 flow.  Right coronary artery:  No obstructive disease with less than 10% epicardial stenosis     Left ventriculography:  EF 65%    Review of patient's allergies indicates:  No Known Allergies    No current facility-administered medications on file prior to encounter.     No current outpatient medications on file prior to encounter.     Review of Systems   Constitutional: Negative for malaise/fatigue.   Cardiovascular:  Negative for chest pain, leg swelling, palpitations and paroxysmal nocturnal dyspnea.   Respiratory:  Negative for shortness of breath.    All other systems reviewed and are negative.    Objective:     Vital Signs (Most Recent):  Temp: 98.2 °F (36.8 °C) (09/12/24 0704)  Pulse: 65 (09/12/24 0704)  Resp: 18 (09/12/24  0704)  BP: 119/79 (09/12/24 0704)  SpO2: 97 % (09/12/24 0704) Vital Signs (24h Range):  Temp:  [97.4 °F (36.3 °C)-98.5 °F (36.9 °C)] 98.2 °F (36.8 °C)  Pulse:  [] 65  Resp:  [16-22] 18  SpO2:  [94 %-99 %] 97 %  BP: (113-134)/(60-79) 119/79     Weight: 68 kg (150 lb)  Body mass index is 24.21 kg/m².    SpO2: 97 %         Intake/Output Summary (Last 24 hours) at 9/12/2024 0838  Last data filed at 9/12/2024 0546  Gross per 24 hour   Intake 870 ml   Output 600 ml   Net 270 ml       Lines/Drains/Airways       Peripheral Intravenous Line  Duration                  Peripheral IV - Single Lumen 09/11/24 1456 20 G Anterior;Left;Proximal Forearm <1 day         Peripheral IV - Single Lumen 09/11/24 1456 20 G Anterior;Right Forearm <1 day                  Physical Exam  Constitutional:       General: He is not in acute distress.     Appearance: Normal appearance.   HENT:      Head: Normocephalic.      Mouth/Throat:      Mouth: Mucous membranes are moist.   Eyes:      Extraocular Movements: Extraocular movements intact.      Conjunctiva/sclera: Conjunctivae normal.   Cardiovascular:      Rate and Rhythm: Normal rate and regular rhythm.      Heart sounds: Normal heart sounds.   Pulmonary:      Effort: Pulmonary effort is normal. No respiratory distress.      Breath sounds: Normal breath sounds.   Abdominal:      Palpations: Abdomen is soft.   Musculoskeletal:         General: Normal range of motion.   Skin:     General: Skin is warm and dry.      Comments: Right dressing C/D/I without bleeding or hematoma   Neurological:      General: No focal deficit present.      Mental Status: He is alert and oriented to person, place, and time.   Psychiatric:         Mood and Affect: Mood normal.       EKG:     Telemetry: SR    Home Medications:   No current facility-administered medications on file prior to encounter.     No current outpatient medications on file prior to encounter.     Current Schedule Inpatient Medications:   aspirin  " 81 mg Oral Daily    atorvastatin  80 mg Oral Daily    ticagrelor  90 mg Oral BID     Continuous Infusions:     Significant Labs:   Chemistries:   Recent Labs   Lab 09/11/24  0844 09/11/24  1135 09/12/24  0335     --  139   K 4.2  --  4.4   *  --  108*   CO2 13*  --  25   BUN 13.5  --  16.7   CREATININE 1.07  --  0.92   CALCIUM 9.1  --  9.2   BILITOT 0.3  --  0.3   ALKPHOS 100  --  82   ALT 33  --  35   AST 36*  --  43*   GLUCOSE 61*  --  101*   MG  --   --  2.20   TROPONINI <0.010 1.327* 1.585*        CBC/Anemia Labs: Coags:    Recent Labs   Lab 09/11/24  0844 09/11/24  2357 09/12/24  0335   WBC 24.47  24.47* 14.59* 13.83*   HGB 16.4 14.6 14.6   HCT 49.5 44.5 44.2    266 249   MCV 97.1* 97.4* 96.1*   RDW 13.0 13.2 13.2    No results for input(s): "PT", "INR", "APTT" in the last 168 hours.     Assessment and Plan:   STEMI - Anterior Leads with Reciprocal Changes  CAD     -s/p PCI w/ SUNNY Mid Left Circumflex  HLD    -On High Intensity Statin  Tobacco Use   No Hx of GIB     PLAN:  Continue ASA, Brilinta, Statin  Start Coreg 3.125 mg PO BID with hold parameters  Labs and EKG in AM: CBC, CMP, Mg  Likely d/c home in am tomorrow       Makenzie Eldridge, FRANKLIN  Cardiology  [facility]  09/12/2024   "

## 2024-09-12 NOTE — DISCHARGE INSTRUCTIONS
ANGIOGRAM/ANGIOPLASTY/STENT PLACEMENT    · WHAT IS AN ANGIOGRAM?  o A catheter was placed through the blood vessel in your groin, contrast was injected into the vessels were taken.    · WHAT IS AN ANGIOPLASTY?  o A catheter was placed through the blood vessel in your groin and directed to an area of blocked blood flow. A balloon, and possibly a metal stent, were used to open the blockage. If no other blockages are present below this area your symptoms should improve. If blockages are present below this area, surgery may be necessary.    · WHAT IS STENT PLACEMENT?  o A catheter was placed in your groin through which a metal mesh tube was placed in a narrowed part of the artery to facilitate blood flow.  o You may feel some discomfort at the insertions site that should improve over a few days.    · POST-OP CARE:  o May remove dressing 24 hours after procedure. (Soak dressing with warm water and gently peel off. DO NOT RIP).  o If you begin to bleed, lay flat immediately, apply firm direct pressure to the site. CALL 911. (THIS IS AN EMERGENCY!!)  o You may shower with antibacterial soap and water only. NO tub bathing/swimming for 3 days!  o Keep the site clean and dry. No ointments, powder, cologne, or lotion near the site until fully healed. (About 2 weeks)  o Monitor site for signs or symptoms of infection  § Fever >101  § Yellow/green drainage  § Swelling  § Worsening pain  § Dizziness/fainting  o NO LIFTING PUSHING OR PULLING ANYTHING GREATER THAN 10 POUNDS ( A GALLON OF MILK) FOR 72 HOURS.  o NO DRIVING FOR 48 HOURS. (This includes all motor vehicles)

## 2024-09-12 NOTE — NURSING
Nurses Note -- 4 Eyes      9/11/2024   19:00 PM      Skin assessed during: Q Shift Change      [x] No Altered Skin Integrity Present    []Prevention Measures Documented      [] Yes- Altered Skin Integrity Present or Discovered   [] LDA Added if Not in Epic (Describe Wound)   [] New Altered Skin Integrity was Present on Admit and Documented in LDA   [] Wound Image Taken    Wound Care Consulted? No    Attending Nurse:  Hollie Torres RN/Staff Member:  Catia

## 2024-09-12 NOTE — PLAN OF CARE
09/12/24 1201   Discharge Assessment   Assessment Type Discharge Planning Assessment   Confirmed/corrected address, phone number and insurance Yes   Confirmed Demographics Correct on Facesheet   Source of Information patient   Communicated EVETTE with patient/caregiver Date not available/Unable to determine   Reason For Admission STEMI   People in Home spouse;child(micky), dependent  (Pt lives with his wife, Brionna and 2 children in a single story home with no steps to enter the home)   Do you expect to return to your current living situation? Yes   Do you have help at home or someone to help you manage your care at home? Yes   Who are your caregiver(s) and their phone number(s)? wife Brionna---[phone]   Prior to hospitilization cognitive status: Unable to Assess   Current cognitive status: Alert/Oriented   Walking or Climbing Stairs Difficulty no   Dressing/Bathing Difficulty no   Home Layout Able to live on 1st floor   Equipment Currently Used at Home none   Readmission within 30 days? No   Patient currently being followed by outpatient case management? No   Do you currently have service(s) that help you manage your care at home? No   Who is going to help you get home at discharge? family   How do you get to doctors appointments? car, drives self   Are you on dialysis? No   Discharge Plan A Home with family   Discharge Plan B Home   DME Needed Upon Discharge  none   Discharge Plan discussed with: Patient   Transition of Care Barriers None   Housing Stability   In the last 12 months, was there a time when you were not able to pay the mortgage or rent on time? N   Transportation Needs   Has the lack of transportation kept you from medical appointments, meetings, work or from getting things needed for daily living? No   Food Insecurity   Within the past 12 months, you worried that your food would run out before you got the money to buy more. Never true   OTHER   Name(s) of People in Home wife, and 2 children      Pt's PCP is Dr Billy in [place]. Pt's  is his wife, Brionna ([phone]). Pt has never had HH services. Pt does not have a particular pharmacy he uses. 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 Pt has no dc needs at this time.

## 2024-09-13 VITALS
BODY MASS INDEX: 24.11 KG/M2 | WEIGHT: 150 LBS | HEIGHT: 66 IN | SYSTOLIC BLOOD PRESSURE: 126 MMHG | DIASTOLIC BLOOD PRESSURE: 87 MMHG | RESPIRATION RATE: 18 BRPM | TEMPERATURE: 98 F | HEART RATE: 61 BPM | OXYGEN SATURATION: 98 %

## 2024-09-13 LAB
ANION GAP SERPL CALC-SCNC: 8 MEQ/L
BASOPHILS # BLD AUTO: 0.08 X10(3)/MCL
BASOPHILS NFR BLD AUTO: 0.7 %
BUN SERPL-MCNC: 13.9 MG/DL (ref 8.9–20.6)
CALCIUM SERPL-MCNC: 9.9 MG/DL (ref 8.4–10.2)
CHLORIDE SERPL-SCNC: 106 MMOL/L (ref 98–107)
CO2 SERPL-SCNC: 25 MMOL/L (ref 22–29)
CREAT SERPL-MCNC: 0.89 MG/DL (ref 0.73–1.18)
CREAT/UREA NIT SERPL: 16
EOSINOPHIL # BLD AUTO: 0.32 X10(3)/MCL (ref 0–0.9)
EOSINOPHIL NFR BLD AUTO: 2.9 %
ERYTHROCYTE [DISTWIDTH] IN BLOOD BY AUTOMATED COUNT: 13 % (ref 11.5–17)
GFR SERPLBLD CREATININE-BSD FMLA CKD-EPI: >60 ML/MIN/1.73/M2
GLUCOSE SERPL-MCNC: 108 MG/DL (ref 74–100)
HCT VFR BLD AUTO: 43.7 % (ref 42–52)
HGB BLD-MCNC: 14.8 G/DL (ref 14–18)
IMM GRANULOCYTES # BLD AUTO: 0.04 X10(3)/MCL (ref 0–0.04)
IMM GRANULOCYTES NFR BLD AUTO: 0.4 %
LYMPHOCYTES # BLD AUTO: 3.01 X10(3)/MCL (ref 0.6–4.6)
LYMPHOCYTES NFR BLD AUTO: 27.6 %
MAGNESIUM SERPL-MCNC: 2 MG/DL (ref 1.6–2.6)
MCH RBC QN AUTO: 31.8 PG (ref 27–31)
MCHC RBC AUTO-ENTMCNC: 33.9 G/DL (ref 33–36)
MCV RBC AUTO: 93.8 FL (ref 80–94)
MONOCYTES # BLD AUTO: 1.16 X10(3)/MCL (ref 0.1–1.3)
MONOCYTES NFR BLD AUTO: 10.6 %
NEUTROPHILS # BLD AUTO: 6.31 X10(3)/MCL (ref 2.1–9.2)
NEUTROPHILS NFR BLD AUTO: 57.8 %
NRBC BLD AUTO-RTO: 0 %
OHS QRS DURATION: 84 MS
OHS QTC CALCULATION: 484 MS
PLATELET # BLD AUTO: 249 X10(3)/MCL (ref 130–400)
PMV BLD AUTO: 9.3 FL (ref 7.4–10.4)
POTASSIUM SERPL-SCNC: 4.5 MMOL/L (ref 3.5–5.1)
RBC # BLD AUTO: 4.66 X10(6)/MCL (ref 4.7–6.1)
SODIUM SERPL-SCNC: 139 MMOL/L (ref 136–145)
WBC # BLD AUTO: 10.92 X10(3)/MCL (ref 4.5–11.5)

## 2024-09-13 PROCEDURE — 25000003 PHARM REV CODE 250: Performed by: NURSE PRACTITIONER

## 2024-09-13 PROCEDURE — 93005 ELECTROCARDIOGRAM TRACING: CPT

## 2024-09-13 PROCEDURE — 36415 COLL VENOUS BLD VENIPUNCTURE: CPT | Performed by: NURSE PRACTITIONER

## 2024-09-13 PROCEDURE — 80048 BASIC METABOLIC PNL TOTAL CA: CPT | Performed by: NURSE PRACTITIONER

## 2024-09-13 PROCEDURE — 85025 COMPLETE CBC W/AUTO DIFF WBC: CPT | Performed by: NURSE PRACTITIONER

## 2024-09-13 PROCEDURE — 93010 ELECTROCARDIOGRAM REPORT: CPT | Mod: ,,, | Performed by: INTERNAL MEDICINE

## 2024-09-13 PROCEDURE — 83735 ASSAY OF MAGNESIUM: CPT | Performed by: NURSE PRACTITIONER

## 2024-09-13 PROCEDURE — 25000003 PHARM REV CODE 250: Performed by: INTERNAL MEDICINE

## 2024-09-13 RX ORDER — ATORVASTATIN CALCIUM 80 MG/1
80 TABLET, FILM COATED ORAL DAILY
Qty: 30 TABLET | Refills: 11 | Status: SHIPPED | OUTPATIENT
Start: 2024-09-14 | End: 2025-09-14

## 2024-09-13 RX ORDER — ASPIRIN 81 MG/1
81 TABLET ORAL DAILY
Qty: 30 TABLET | Refills: 11 | Status: SHIPPED | OUTPATIENT
Start: 2024-09-14 | End: 2025-09-14

## 2024-09-13 RX ORDER — CARVEDILOL 3.12 MG/1
3.12 TABLET ORAL 2 TIMES DAILY
Qty: 60 TABLET | Refills: 11 | Status: SHIPPED | OUTPATIENT
Start: 2024-09-13 | End: 2025-09-13

## 2024-09-13 RX ADMIN — TICAGRELOR 90 MG: 90 TABLET ORAL at 09:09

## 2024-09-13 RX ADMIN — CARVEDILOL 3.12 MG: 3.12 TABLET, FILM COATED ORAL at 09:09

## 2024-09-13 RX ADMIN — ASPIRIN 81 MG: 81 TABLET, COATED ORAL at 09:09

## 2024-09-13 RX ADMIN — ATORVASTATIN CALCIUM 80 MG: 40 TABLET, FILM COATED ORAL at 09:09

## 2024-09-13 NOTE — NURSING
Patient education and medication from pharm given to patient. Explanation along with MD follow ups given to patient and wife. Both expressed understanding.     IV d/c'd. Catheter intact. No acute distress noted.     Patient and wife refused to wait to be discharged by wheelchair. Stated that he would walk on his own. No acute distress noted.

## 2024-09-13 NOTE — PLAN OF CARE
09/13/24 1338   Final Note   Assessment Type Final Discharge Note   Anticipated Discharge Disposition Home   Post-Acute Status   Post-Acute Authorization [facility]

## 2024-09-13 NOTE — DISCHARGE SUMMARY
"[facility] 6th Floor Medical Telemetry    Cardiology  Discharge Summary      Patient Name: Roberto Camargo  MRN: [id number]  Admission Date: 9/11/2024  Hospital Length of Stay: 2 days  Discharge Date and Time:  09/13/2024 5:20 PM  Attending Physician: Gallo Zuniga MD  Discharging Provider: FRANKLIN Tobias  Primary Care Physician: Diana, Primary Doctor    HPI/Hospital Course:   HPI: Mr. Camargo is a 45 y/o male who is unknown to [facility]. The patient presented to [facility] on 9.11.24 with c/o CP. The patient reported that this AM he was having a "stressful and emotional argument" with his wife and he developed substernal CP that was rated 10/10 on a verbal scale. The pain was described as a "pressure" that was associated with SOB. He reported that the pain was so severe he fell to the floor at its onset. EMS was notified and upon arrival he was given FD ASA/SL Nitro and an EKG confirmed a STEMI. He was transported to [facility] ER for evaluation and workup.      Hospital Course:  9.12.24: Ambulating in room.  Denies CP or SOB.  Pt wanting to go home or at least go outside so he can smoke.  VSS.  Labs stable.       PMH: HLD  PSH: Denies Past Surgical History   Family History: + Family History of Heart Disease; Maternal and Paternal Grandparents  Social History: Denies Illicit Drug and ETOH Use; + Tobacco Use 1/2-1 PPD for 20 + Years      Previous Cardiac Diagnostics:   TTE (9.11.24)    Left Ventricle: The left ventricle is normal in size. Normal wall thickness. There is normal systolic function with a visually estimated ejection fraction of 55 - 60%. There is normal diastolic function. Normal left ventricular filling pressure.    Right Ventricle: Normal right ventricular cavity size. Systolic function is normal.    Aortic Valve: The aortic valve is structurally normal. Aortic valve peak velocity is 1.28 m/s. Mean gradient is 4 mmHg.    Mitral Valve: The mitral valve is structurally normal.    Tricuspid Valve: " The tricuspid valve is structurally normal.    Pulmonic Valve: There is no stenosis.    Pericardium: There is no pericardial effusion     [facility] (9.11.24)    Successful IVUS guided PTCA and insertion of a 2.5 x 38 mm synergy drug-eluting stent from 70% thrombotic lesion in the mid left circumflex coronary artery down to 0% residual stenosis and FRANCO 3 flow    No obstructive coronary artery disease in the LAD, as well as, RCA    Right dominant system    Preserved LV systolic function    Mildly elevated LVEDP     Coronary findings:     Dominance: right   Left main:  No obstructive disease with less than 10% epicardial stenosis  Left anterior descending artery:  No obstructive disease with less than 10% epicardial stenosis.  Ostium of the large caliber diagonal 2 branch has 20% stenosis.  Circumflex artery:  Large caliber obtuse marginal 1 branch which is free of any obstructive disease with less than 10% epicardial stenosis.  In the mid true AV groove circumflex coronary artery (after the origin of the OM branch) haziness noted suggestive of possible ruptured plaque.  There is FRANCO 3 flow.  Right coronary artery:  No obstructive disease with less than 10% epicardial stenosis     Left ventriculography:  EF 65%    Procedure(s) (LRB):  Angiogram, Coronary, with Left Heart Cath (N/A)  IVUS, Coronary  INSERTION, STENT, CORONARY ARTERY (N/A)  Ventriculogram, Left   Consults:   Final Active Diagnoses:    Diagnosis Date Noted POA    PRINCIPAL PROBLEM:  ST elevation myocardial infarction involving left circumflex coronary artery [I21.21] 09/11/2024 Yes      Problems Resolved During this Admission:     Discharged Condition: stable  Review of Systems   Cardiovascular:  Negative for chest pain, dyspnea on exertion and palpitations.   Respiratory:  Negative for cough and shortness of breath.    All other systems reviewed and are negative.    Physical Exam  Constitutional:       Appearance: Normal appearance.   Cardiovascular:       Rate and Rhythm: Normal rate and regular rhythm.      Pulses: Normal pulses.           Dorsalis pedis pulses are 2+ on the right side and 2+ on the left side.      Heart sounds: Normal heart sounds.   Pulmonary:      Effort: Pulmonary effort is normal.      Breath sounds: Normal breath sounds.   Musculoskeletal:      Right lower leg: No edema.      Left lower leg: No edema.   Skin:     General: Skin is warm and dry.      Comments: Right groin soft without bleeding or hematoma   Neurological:      Mental Status: He is alert and oriented to person, place, and time.   Psychiatric:         Mood and Affect: Mood normal.         Behavior: Behavior normal.       Disposition: Home or Self Care  Follow Up:   Follow-up Information       Taiwo Ayoub Jr., MD Follow up on 9/18/2024.    Specialty: Cardiology  Why: 10:10 AM  Contact information:  [address] [place] [address]  [phone]               Jacoby Gonzalez MD Follow up on 9/20/2024.    Specialty: Family Medicine  Why: @ 10:45  Contact information:  [address]  [facility] [address]  [phone]                           Patient Instructions:      Diet Cardiac     Lifting restrictions   Order Comments: Do not lift anything heavier than 5-10 lbs for one week     No driving until:   Order Comments: No driving for 3 days     Activity as tolerated     Shower on day dressing removed (No bath)   Order Comments: Do not submerge in water until right groin completely healed     Medications:  Reconciled Home Medications:      Medication List        START taking these medications      aspirin 81 MG EC tablet  Commonly known as: ECOTRIN  Take 1 tablet (81 mg total) by mouth once daily.  Start taking on: September 14, 2024     atorvastatin 80 MG tablet  Commonly known as: LIPITOR  Take 1 tablet (80 mg total) by mouth once daily.  Start taking on: September 14, 2024     carvediloL 3.125 MG tablet  Commonly known as: COREG  Take 1 tablet (3.125  mg total) by mouth 2 (two) times daily.     ticagrelor 90 mg tablet  Commonly known as: BRILINTA  Take 1 tablet (90 mg total) by mouth 2 (two) times a day.          ASSESSMENT:  STEMI - Anterior Leads with Reciprocal Changes     -S/P PCI W/SUNNY TO Left Circumflex  CAD     -s/p PCI w/ SUNNY Mid Left Circumflex  HLD    -On High Intensity Statin  Tobacco Use   No Hx of GIB      PLAN:  Continue ASA, Brilinta, Statin  Continue Coreg 3.125 mg PO BID   F/U with Dr. Ayoub 9.18.24  Ok to d/c home              IMPORTANT Antiplatelet Medication Instructions:  The patient, Roberto Camargo, was instructed by Makenzie Eldridge on the importance of Antiplatelet Medication(s) and he verbalizes understanding.   He will continue taking his present antiplatelet ASA & Brilinta as prescribed.   His new antiplatelet ASA & Brilinta was sent to the [facility] pharmacy.  **WARNING:  Never stop **PLAVIX, EFFIENT, or BRILINTA** without first speaking to a [facility] provider** (even if another physician or surgeon insists it must be stopped for a procedure)    Time spent on the discharge of patient: 39 minutes    FRANKLIN Tobias  Cardiology  [facility] - 6th Floor Medical Telemetry

## 2024-09-13 NOTE — NURSING
Nurses Note -- 4 Eyes      9/12/2024   19:00 PM      Skin assessed during: Q Shift Change      [x] No Altered Skin Integrity Present    []Prevention Measures Documented      [] Yes- Altered Skin Integrity Present or Discovered   [] LDA Added if Not in Epic (Describe Wound)   [] New Altered Skin Integrity was Present on Admit and Documented in LDA   [] Wound Image Taken    Wound Care Consulted? No    Attending Nurse:  Hollie Torres RN/Staff Member:  Catia SIMPSON

## 2024-09-13 NOTE — NURSING
Nurses Note -- 4 Eyes      9/13/2024   7:20 AM      Skin assessed during: Q Shift Change      [x] No Altered Skin Integrity Present    []Prevention Measures Documented      [] Yes- Altered Skin Integrity Present or Discovered   [] LDA Added if Not in Epic (Describe Wound)   [] New Altered Skin Integrity was Present on Admit and Documented in LDA   [] Wound Image Taken    Wound Care Consulted? No    Attending Nurse:  TATIANA Reza    Second RN/Staff Member:  Hollie

## 2024-09-16 PROBLEM — Z09 HOSPITAL DISCHARGE FOLLOW-UP: Status: ACTIVE | Noted: 2024-09-16

## 2024-09-16 PROBLEM — Z76.89 ENCOUNTER TO ESTABLISH CARE WITH NEW DOCTOR: Status: ACTIVE | Noted: 2024-09-16

## 2024-09-16 NOTE — PROGRESS NOTES
"Establish Care       HPI:    Patient presents to establish care and for hospital discharge follow-up.    Admit date 09/11/2024.    Discharge date 09/13/2024.    HPI/Hospital Course:   HPI: Mr. Camargo is a 43 y/o male who is unknown to [facility]. The patient presented to [facility] on 9.11.24 with c/o CP. The patient reported that this AM he was having a "stressful and emotional argument" with his wife and he developed substernal CP that was rated 10/10 on a verbal scale. The pain was described as a "pressure" that was associated with SOB. He reported that the pain was so severe he fell to the floor at its onset. EMS was notified and upon arrival he was given FD ASA/SL Nitro and an EKG confirmed a STEMI. He was transported to [facility] ER for evaluation and workup.   PMH: HLD  PSH: Denies Past Surgical History   Family History: + Family History of Heart Disease; Maternal and Paternal Grandparents  Social History: Denies Illicit Drug and ETOH Use; + Tobacco Use 1/2-1 PPD for 20 + Years.  TTE (9.11.24)    Left Ventricle: The left ventricle is normal in size. Normal wall thickness. There is normal systolic function with a visually estimated ejection fraction of 55 - 60%. There is normal diastolic function. Normal left ventricular filling pressure.    Right Ventricle: Normal right ventricular cavity size. Systolic function is normal.    Aortic Valve: The aortic valve is structurally normal. Aortic valve peak velocity is 1.28 m/s. Mean gradient is 4 mmHg.    Mitral Valve: The mitral valve is structurally normal.    Tricuspid Valve: The tricuspid valve is structurally normal.    Pulmonic Valve: There is no stenosis.    Pericardium: There is no pericardial effusion.    [facility] (9.11.24)    Successful IVUS guided PTCA and insertion of a 2.5 x 38 mm synergy drug-eluting stent from 70% thrombotic lesion in the mid left circumflex coronary artery down to 0% residual stenosis and FRANCO 3 flow    No obstructive coronary artery disease in the LAD, " as well as, RCA    Right dominant system    Preserved LV systolic function    Mildly elevated LVEDP  Coronary findings:     Dominance: right   Left main:  No obstructive disease with less than 10% epicardial stenosis  Left anterior descending artery:  No obstructive disease with less than 10% epicardial stenosis.  Ostium of the large caliber diagonal 2 branch has 20% stenosis.  Circumflex artery:  Large caliber obtuse marginal 1 branch which is free of any obstructive disease with less than 10% epicardial stenosis.  In the mid true AV groove circumflex coronary artery (after the origin of the OM branch) haziness noted suggestive of possible ruptured plaque.  There is FRANCO 3 flow.  Right coronary artery:  No obstructive disease with less than 10% epicardial stenosis        Current Outpatient Medications   Medication Instructions    aspirin (ECOTRIN) 81 mg, Oral, Daily    atorvastatin (LIPITOR) 80 mg, Oral, Daily    carvediloL (COREG) 3.125 mg, Oral, 2 times daily    ticagrelor (BRILINTA) 90 mg, Oral, 2 times daily         ROS:    He has been feeling okay since being home.  He had some right groin pain which is better.  He is sleeping well.   His appetite is good.  He saw Dr Zuniga on 9/18/2024; released to return to work. He will see him on 12/18/2024.     He is  with 2 children.   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   He was smoking 1/2-1 pack per day since age 17. He was prescribed patches  per cardiology.   He has alcohol infrequently.   He has 2-3 cups of coffee daily. He has 3 regular sodas a day.     Patient denies any chest pain, pressure, tightness, palpitations or shortness for breath.  He denies any nausea, vomiting, abdominal pain, diarrhea or constipation.    He denies any dysuria, frequency or hematuria.    He denies any peripheral edema.  He denies any headaches, dizziness or lightheadedness        PE:    ..Visit Vitals  /85 (BP Location: Right arm, Patient Position: Sitting, BP  "Method: Large (Automatic))   Pulse 84   Temp 98.4 °F (36.9 °C)   Resp 18   Ht 5' 6" (1.676 m)   Wt 65.8 kg (145 lb)   SpO2 95%   BMI 23.40 kg/m²      General: He is well-developed well-nourished  male in no apparent distress.  He is alert and oriented.  His affect is appropriate.    Chest: Clear to auscultation bilaterally.    CV: Regular rate rhythm without murmurs rubs gallops.    Abdomen: Soft, nontender, no masses.    Bilateral lower extremities: Without edema.          1. Encounter to establish care with new doctor  Overview:  09/20/2024:   Patient presents to establish care and for hospital discharge follow-up.    Hospital records reviewed.    Past medical history, social history and medications reviewed with patient.  Follow-up in 6 months for wellness examination.      2. Hospital discharge follow-up  Overview:  09/20/2024:  Patient presents for hospital discharge follow-up.  He is status post ST-elevation myocardial infarction involving left circumflex artery with subsequent placement of drug-eluting stent.  Patient has been feeling well since discharge.  He denies any issues or concerns.  Follow-up in 6 months for wellness examination.      3. ST elevation myocardial infarction involving left circumflex coronary artery  Overview:  09/20/2024:  Patient is status post ST-elevation myocardial infarction involving left circumflex artery.  Status post placement of drug-eluting stent per Dr. Zuniga.   Patient had follow-up with Dr. Zuniga on 9/18/2024.   Patient released to return to work.  Patient is asymptomatic and doing well.      4. Immunization declined  Assessment & Plan:  Patient declines a flu vaccine at this time; benefits, risks and side effects discussed with patient.      5. Immunization due  Assessment & Plan:  Patient advised to get a Tdap booster; benefits, risks and side effects discussed with patient.                ..Follow up in about 6 months (around 3/20/2025) for Wellness. "       Future Appointments   Date Time Provider Department Center   3/20/2025 10:45 AM Jacoby Gonzalez MD [facility] Nabeel KAUFMAN

## 2024-09-20 ENCOUNTER — OFFICE VISIT (OUTPATIENT)
Dept: PRIMARY CARE CLINIC | Facility: CLINIC | Age: 44
End: 2024-09-20
Payer: COMMERCIAL

## 2024-09-20 VITALS
HEART RATE: 84 BPM | RESPIRATION RATE: 18 BRPM | WEIGHT: 145 LBS | HEIGHT: 66 IN | TEMPERATURE: 98 F | DIASTOLIC BLOOD PRESSURE: 85 MMHG | BODY MASS INDEX: 23.3 KG/M2 | OXYGEN SATURATION: 95 % | SYSTOLIC BLOOD PRESSURE: 133 MMHG

## 2024-09-20 DIAGNOSIS — Z23 IMMUNIZATION DUE: ICD-10-CM

## 2024-09-20 DIAGNOSIS — Z09 HOSPITAL DISCHARGE FOLLOW-UP: ICD-10-CM

## 2024-09-20 DIAGNOSIS — Z76.89 ENCOUNTER TO ESTABLISH CARE WITH NEW DOCTOR: Primary | ICD-10-CM

## 2024-09-20 DIAGNOSIS — Z28.21 IMMUNIZATION DECLINED: ICD-10-CM

## 2024-09-20 DIAGNOSIS — I21.21 ST ELEVATION MYOCARDIAL INFARCTION INVOLVING LEFT CIRCUMFLEX CORONARY ARTERY: ICD-10-CM

## 2024-09-20 NOTE — ASSESSMENT & PLAN NOTE
Patient declines a flu vaccine at this time; benefits, risks and side effects discussed with patient.

## 2025-03-07 DIAGNOSIS — Z00.00 WELLNESS EXAMINATION: Primary | ICD-10-CM

## 2025-03-15 PROBLEM — I25.10 CORONARY ARTERY DISEASE INVOLVING NATIVE CORONARY ARTERY OF NATIVE HEART WITHOUT ANGINA PECTORIS: Status: ACTIVE | Noted: 2025-03-15

## 2025-03-15 PROBLEM — Z00.00 ENCOUNTER FOR WELLNESS EXAMINATION IN ADULT: Status: ACTIVE | Noted: 2025-03-15

## 2025-03-15 PROBLEM — I25.2 HISTORY OF MYOCARDIAL INFARCTION: Status: ACTIVE | Noted: 2024-09-11

## 2025-08-23 ENCOUNTER — HOSPITAL ENCOUNTER (EMERGENCY)
Facility: HOSPITAL | Age: 45
Discharge: HOME | End: 2025-08-23
Attending: FAMILY MEDICINE
Payer: COMMERCIAL

## 2025-08-23 VITALS
HEIGHT: 70 IN | BODY MASS INDEX: 18.93 KG/M2 | DIASTOLIC BLOOD PRESSURE: 99 MMHG | SYSTOLIC BLOOD PRESSURE: 156 MMHG | HEART RATE: 90 BPM | WEIGHT: 132.25 LBS | RESPIRATION RATE: 18 BRPM | TEMPERATURE: 98 F | OXYGEN SATURATION: 99 %

## 2025-08-23 DIAGNOSIS — R53.1 WEAKNESS: ICD-10-CM

## 2025-08-23 DIAGNOSIS — F10.920 ACUTE ALCOHOLIC INTOXICATION WITHOUT COMPLICATION: Primary | ICD-10-CM

## 2025-08-23 LAB
ANION GAP SERPL CALC-SCNC: 15 MEQ/L
BASOPHILS # BLD AUTO: 0.13 X10(3)/MCL
BASOPHILS NFR BLD AUTO: 0.9 %
BUN SERPL-MCNC: 8 MG/DL (ref 8.9–20.6)
CALCIUM SERPL-MCNC: 9.2 MG/DL (ref 8.4–10.2)
CHLORIDE SERPL-SCNC: 108 MMOL/L (ref 98–107)
CO2 SERPL-SCNC: 23 MMOL/L (ref 22–29)
CREAT SERPL-MCNC: 0.82 MG/DL (ref 0.72–1.25)
CREAT/UREA NIT SERPL: 10
EOSINOPHIL # BLD AUTO: 0.27 X10(3)/MCL (ref 0–0.9)
EOSINOPHIL NFR BLD AUTO: 1.9 %
ERYTHROCYTE [DISTWIDTH] IN BLOOD BY AUTOMATED COUNT: 13.2 % (ref 11.5–17)
ETHANOL SERPL-MCNC: 284 MG/DL
GFR SERPLBLD CREATININE-BSD FMLA CKD-EPI: >60 ML/MIN/1.73/M2
GLUCOSE SERPL-MCNC: 86 MG/DL (ref 74–100)
HCT VFR BLD AUTO: 43 % (ref 42–52)
HGB BLD-MCNC: 14.8 G/DL (ref 14–18)
HOLD SPECIMEN: NORMAL
IMM GRANULOCYTES # BLD AUTO: 0.04 X10(3)/MCL (ref 0–0.04)
IMM GRANULOCYTES NFR BLD AUTO: 0.3 %
LYMPHOCYTES # BLD AUTO: 3.48 X10(3)/MCL (ref 0.6–4.6)
LYMPHOCYTES NFR BLD AUTO: 24.9 %
MAGNESIUM SERPL-MCNC: 2.3 MG/DL (ref 1.6–2.6)
MCH RBC QN AUTO: 32 PG (ref 27–31)
MCHC RBC AUTO-ENTMCNC: 34.4 G/DL (ref 33–36)
MCV RBC AUTO: 93.1 FL (ref 80–94)
MONOCYTES # BLD AUTO: 1.12 X10(3)/MCL (ref 0.1–1.3)
MONOCYTES NFR BLD AUTO: 8 %
NEUTROPHILS # BLD AUTO: 8.96 X10(3)/MCL (ref 2.1–9.2)
NEUTROPHILS NFR BLD AUTO: 64 %
NRBC BLD AUTO-RTO: 0 %
PLATELET # BLD AUTO: 283 X10(3)/MCL (ref 130–400)
PMV BLD AUTO: 9 FL (ref 7.4–10.4)
POCT GLUCOSE: 89 MG/DL (ref 70–110)
POTASSIUM SERPL-SCNC: 3.9 MMOL/L (ref 3.5–5.1)
RBC # BLD AUTO: 4.62 X10(6)/MCL (ref 4.7–6.1)
SODIUM SERPL-SCNC: 146 MMOL/L (ref 136–145)
TROPONIN I SERPL HS-MCNC: <3 NG/L
WBC # BLD AUTO: 14 X10(3)/MCL (ref 4.5–11.5)

## 2025-08-23 PROCEDURE — 96360 HYDRATION IV INFUSION INIT: CPT

## 2025-08-23 PROCEDURE — 84484 ASSAY OF TROPONIN QUANT: CPT | Performed by: FAMILY MEDICINE

## 2025-08-23 PROCEDURE — 93005 ELECTROCARDIOGRAM TRACING: CPT

## 2025-08-23 PROCEDURE — 99284 EMERGENCY DEPT VISIT MOD MDM: CPT | Mod: 25

## 2025-08-23 PROCEDURE — 83735 ASSAY OF MAGNESIUM: CPT | Performed by: FAMILY MEDICINE

## 2025-08-23 PROCEDURE — 85025 COMPLETE CBC W/AUTO DIFF WBC: CPT | Performed by: FAMILY MEDICINE

## 2025-08-23 PROCEDURE — 80048 BASIC METABOLIC PNL TOTAL CA: CPT | Performed by: FAMILY MEDICINE

## 2025-08-23 PROCEDURE — 82077 ASSAY SPEC XCP UR&BREATH IA: CPT | Performed by: FAMILY MEDICINE

## 2025-08-23 PROCEDURE — 63600175 PHARM REV CODE 636 W HCPCS: Performed by: FAMILY MEDICINE

## 2025-08-23 PROCEDURE — 82962 GLUCOSE BLOOD TEST: CPT

## 2025-08-23 RX ADMIN — SODIUM CHLORIDE, POTASSIUM CHLORIDE, SODIUM LACTATE AND CALCIUM CHLORIDE 1000 ML: 600; 310; 30; 20 INJECTION, SOLUTION INTRAVENOUS at 04:08

## 2025-08-25 LAB
OHS QRS DURATION: 74 MS
OHS QTC CALCULATION: 471 MS

## (undated) DEVICE — SET ANGIO ACIST CVI ANGIOTOUCH

## (undated) DEVICE — CATH IMPULSE MP 5FR 145CM

## (undated) DEVICE — GUIDEWIRE RUNTHROUGH EF 180CM

## (undated) DEVICE — CATH NC EMERGE MR 3X15MM

## (undated) DEVICE — SHEATH INTRODUCER 6FR 11CM

## (undated) DEVICE — GUIDEWIRE INQWIRE SE 3MM JTIP

## (undated) DEVICE — CATH OPTITORQUE TIG 4.0 100 CM

## (undated) DEVICE — PAD DEFIB CADENCE ADULT R2

## (undated) DEVICE — Device

## (undated) DEVICE — CATH EAGLE EYE PLATINUM

## (undated) DEVICE — GUIDE LAUNCHER 6FR EBU 3.5

## (undated) DEVICE — KIT GLIDESHEATH SLEND 6FR 10CM

## (undated) DEVICE — CANNULA NASAL ADULT

## (undated) DEVICE — VALVE CONTROL COPILOT

## (undated) DEVICE — KIT MINI STICK MAX 5F 21GX10CM